# Patient Record
Sex: FEMALE | Race: OTHER | NOT HISPANIC OR LATINO
[De-identification: names, ages, dates, MRNs, and addresses within clinical notes are randomized per-mention and may not be internally consistent; named-entity substitution may affect disease eponyms.]

---

## 2018-03-25 ENCOUNTER — TRANSCRIPTION ENCOUNTER (OUTPATIENT)
Age: 48
End: 2018-03-25

## 2021-08-23 PROBLEM — Z00.00 ENCOUNTER FOR PREVENTIVE HEALTH EXAMINATION: Status: ACTIVE | Noted: 2021-08-23

## 2021-08-25 ENCOUNTER — OUTPATIENT (OUTPATIENT)
Dept: OUTPATIENT SERVICES | Facility: HOSPITAL | Age: 51
LOS: 1 days | Discharge: HOME | End: 2021-08-25

## 2021-08-25 DIAGNOSIS — D69.6 THROMBOCYTOPENIA, UNSPECIFIED: ICD-10-CM

## 2021-08-26 ENCOUNTER — RESULT REVIEW (OUTPATIENT)
Age: 51
End: 2021-08-26

## 2021-08-26 ENCOUNTER — APPOINTMENT (OUTPATIENT)
Dept: HEMATOLOGY ONCOLOGY | Facility: CLINIC | Age: 51
End: 2021-08-26
Payer: COMMERCIAL

## 2021-08-26 VITALS
DIASTOLIC BLOOD PRESSURE: 84 MMHG | HEIGHT: 64 IN | WEIGHT: 214.5 LBS | HEART RATE: 83 BPM | SYSTOLIC BLOOD PRESSURE: 130 MMHG | BODY MASS INDEX: 36.62 KG/M2 | OXYGEN SATURATION: 97 %

## 2021-08-26 DIAGNOSIS — D47.3 ESSENTIAL (HEMORRHAGIC) THROMBOCYTHEMIA: ICD-10-CM

## 2021-08-26 DIAGNOSIS — Z86.59 PERSONAL HISTORY OF OTHER MENTAL AND BEHAVIORAL DISORDERS: ICD-10-CM

## 2021-08-26 DIAGNOSIS — Z86.39 PERSONAL HISTORY OF OTHER ENDOCRINE, NUTRITIONAL AND METABOLIC DISEASE: ICD-10-CM

## 2021-08-26 PROCEDURE — 99205 OFFICE O/P NEW HI 60 MIN: CPT

## 2021-08-26 PROCEDURE — 85060 BLOOD SMEAR INTERPRETATION: CPT

## 2021-08-26 RX ORDER — CHLORHEXIDINE GLUCONATE 4 %
325 (65 FE) LIQUID (ML) TOPICAL DAILY
Qty: 90 | Refills: 0 | Status: ACTIVE | COMMUNITY
Start: 2021-08-26 | End: 1900-01-01

## 2021-08-27 LAB
ALBUMIN SERPL ELPH-MCNC: 4 G/DL
ALP BLD-CCNC: 64 U/L
ALT SERPL-CCNC: 12 U/L
ANION GAP SERPL CALC-SCNC: 11 MMOL/L
AST SERPL-CCNC: 20 U/L
BILIRUB SERPL-MCNC: 0.2 MG/DL
BUN SERPL-MCNC: 9 MG/DL
CALCIUM SERPL-MCNC: 8.8 MG/DL
CHLORIDE SERPL-SCNC: 108 MMOL/L
CO2 SERPL-SCNC: 23 MMOL/L
CREAT SERPL-MCNC: 0.68 MG/DL
FERRITIN SERPL-MCNC: 9 NG/ML
GLUCOSE SERPL-MCNC: 101 MG/DL
IRON SATN MFR SERPL: 2 %
IRON SERPL-MCNC: 9 UG/DL
POTASSIUM SERPL-SCNC: 4.4 MMOL/L
PROT SERPL-MCNC: 6.9 G/DL
RBC # BLD: 4.59 M/UL
RETICS # AUTO: 1.2 %
RETICS AGGREG/RBC NFR: 54.2 K/UL
SODIUM SERPL-SCNC: 142 MMOL/L
TIBC SERPL-MCNC: 404 UG/DL
UIBC SERPL-MCNC: 395 UG/DL

## 2021-08-31 ENCOUNTER — APPOINTMENT (OUTPATIENT)
Dept: GASTROENTEROLOGY | Facility: CLINIC | Age: 51
End: 2021-08-31

## 2021-09-08 ENCOUNTER — APPOINTMENT (OUTPATIENT)
Dept: GASTROENTEROLOGY | Facility: CLINIC | Age: 51
End: 2021-09-08
Payer: COMMERCIAL

## 2021-09-08 VITALS
RESPIRATION RATE: 14 BRPM | BODY MASS INDEX: 36.7 KG/M2 | DIASTOLIC BLOOD PRESSURE: 78 MMHG | SYSTOLIC BLOOD PRESSURE: 122 MMHG | HEART RATE: 86 BPM | TEMPERATURE: 98 F | OXYGEN SATURATION: 98 % | WEIGHT: 215 LBS | HEIGHT: 64 IN

## 2021-09-08 DIAGNOSIS — Z83.79 FAMILY HISTORY OF OTHER DISEASES OF THE DIGESTIVE SYSTEM: ICD-10-CM

## 2021-09-08 DIAGNOSIS — Z83.49 FAMILY HISTORY OF OTHER ENDOCRINE, NUTRITIONAL AND METABOLIC DISEASES: ICD-10-CM

## 2021-09-08 DIAGNOSIS — Z78.9 OTHER SPECIFIED HEALTH STATUS: ICD-10-CM

## 2021-09-08 DIAGNOSIS — D50.9 IRON DEFICIENCY ANEMIA, UNSPECIFIED: ICD-10-CM

## 2021-09-08 DIAGNOSIS — K50.90 CROHN'S DISEASE, UNSPECIFIED, W/OUT COMPLICATIONS: ICD-10-CM

## 2021-09-08 DIAGNOSIS — Z83.2 FAMILY HISTORY OF DISEASES OF THE BLOOD AND BLOOD-FORMING ORGANS AND CERTAIN DISORDERS INVOLVING THE IMMUNE MECHANISM: ICD-10-CM

## 2021-09-08 DIAGNOSIS — Z12.11 ENCOUNTER FOR SCREENING FOR MALIGNANT NEOPLASM OF COLON: ICD-10-CM

## 2021-09-08 PROCEDURE — 99204 OFFICE O/P NEW MOD 45 MIN: CPT

## 2021-09-08 RX ORDER — LEVOTHYROXINE SODIUM 125 UG/1
125 TABLET ORAL
Refills: 0 | Status: ACTIVE | COMMUNITY

## 2021-09-08 RX ORDER — SODIUM SULFATE, POTASSIUM SULFATE, MAGNESIUM SULFATE 17.5; 3.13; 1.6 G/ML; G/ML; G/ML
17.5-3.13-1.6 SOLUTION, CONCENTRATE ORAL
Qty: 1 | Refills: 0 | Status: DISCONTINUED | COMMUNITY
Start: 2021-09-08 | End: 2021-09-08

## 2021-09-08 RX ORDER — LOSARTAN POTASSIUM 50 MG/1
50 TABLET, FILM COATED ORAL
Refills: 0 | Status: ACTIVE | COMMUNITY

## 2021-09-08 NOTE — ASSESSMENT
[FreeTextEntry1] : Patient is a 50 y/o female with a h/o Crohns disease s/p right hemicolectomy in 2009 who has multiple abdominal surgeries who presents with worsening iron deficiency anemia. \par \par Most recent colonoscopy was in 2018, EGD was done last year prior to her hiatal hernia surgery/repair. \par \par Patient is s/p gastric Laband, removal then subsequent gastric sleeve. EZEKIEL possibly 2/2 malabsorption but will need EGD/Colonoscopy to r/o occult GI bleed. \par \par EGD to be scheduled to r/o gastritis, esophagitis, Samuel's Esophagus, or PUD. Indication, risks and benefit of EGD discussed with patient in detail. All questions answered. Patient is medically optimized for EGD.\par \par Colonoscopy to be scheduled. I have discussed the indications, risks and benefits of procedure with patient. Risks include, but not limited to, bleeding, perforation, infection, and reaction to anesthesia. Alternatives to colonoscopy discussed with patient. Patient was given the opportunity to ask questions, all questions were answered. The patient agrees to proceed with colonoscopy. Patient is medically optimized for colonoscopy. \par \par Miralax/Dulcolax prep to be used. Bowel prep instructions discussed at length. \par \par Hold Iron supplement x 1 week prior to procedure. I preferred Suprep but pt states she is unable to do Suprep or golytely after multiple bad experiences with procedures/bowel prep as a child.

## 2021-09-08 NOTE — PHYSICAL EXAM
[General Appearance - Alert] : alert [General Appearance - In No Acute Distress] : in no acute distress [Sclera] : the sclera and conjunctiva were normal [PERRL With Normal Accommodation] : pupils were equal in size, round, and reactive to light [Extraocular Movements] : extraocular movements were intact [Outer Ear] : the ears and nose were normal in appearance [Oropharynx] : the oropharynx was normal [Auscultation Breath Sounds / Voice Sounds] : lungs were clear to auscultation bilaterally [Heart Rate And Rhythm] : heart rate was normal and rhythm regular [Heart Sounds Gallop] : no gallops [Heart Sounds] : normal S1 and S2 [Murmurs] : no murmurs [Heart Sounds Pericardial Friction Rub] : no pericardial rub [Bowel Sounds] : normal bowel sounds [Abdomen Soft] : soft [] : no hepato-splenomegaly [Abdomen Tenderness] : non-tender [Patient Refused] : rectal exam was refused by the patient [No Focal Deficits] : no focal deficits [Oriented To Time, Place, And Person] : oriented to person, place, and time [Impaired Insight] : insight and judgment were intact [Affect] : the affect was normal [FreeTextEntry1] : +surgical scars

## 2021-09-08 NOTE — HISTORY OF PRESENT ILLNESS
[de-identified] : Patient is a 51 year female, with PMH of Crohns disease diagnosed at 17 y/o s/p right hemicolectomy, s/p splenectomy, s/p Laband in 2012, removal in 2017 and gastric sleeve in 2018, s/p hiatal hernia repair in 2020, who presents for evaluation of anemia. \par \par Patient was diagnosed with CD at 17 y/o and was treated with 6MP, Remicade, Humira and various other modalities prior to having right hemicolectomy in 2009. Since then, she has been in remission.\par \par She has a h/o EZEKIEL but states it significant worsened over the last year. She was referred by hematology for colonoscopy. She is seeing Dr. Townsend, labs done 8/26/21 showed Hgb 8.5 with low iron studies. In the past, patient took oral iron supplement but was unable to tolerate constipation so she self-discontinued. However, she is currently taking oral iron and Miralax daily and tolerating it well. She plans to f/u with hematology in October 2021 for repeat labs and to ensure improvement in Hgb and, if not, will be scheduled for iron infusion. \par \par Patient had splenectomy in 2001. Patient had thyroidectomy in August 2021. Cholecystectomy a few years ago. \par \par Laband in 2012, removal in 2017, gastric sleeve in 2018. Was doing well but in September 2020 she had hiatal hernia repaired and states that since then, she has been able to eat whatever she wants, large amounts - gained 30lbs since the surgery. \par \par Last colonoscopy was in 2018, unsure of results. She had an EGD at [facility] in 2020, prior to hiatal hernia repair. \par \par Patient denies any melena or BRBPR. No obvious bleeding per rectum but was recommended to have colonoscopy by hematology.

## 2021-09-29 ENCOUNTER — OUTPATIENT (OUTPATIENT)
Dept: OUTPATIENT SERVICES | Facility: HOSPITAL | Age: 51
LOS: 1 days | Discharge: HOME | End: 2021-09-29

## 2021-09-29 DIAGNOSIS — D69.6 THROMBOCYTOPENIA, UNSPECIFIED: ICD-10-CM

## 2021-10-04 ENCOUNTER — APPOINTMENT (OUTPATIENT)
Dept: HEMATOLOGY ONCOLOGY | Facility: CLINIC | Age: 51
End: 2021-10-04

## 2021-10-18 ENCOUNTER — APPOINTMENT (OUTPATIENT)
Dept: HEMATOLOGY ONCOLOGY | Facility: CLINIC | Age: 51
End: 2021-10-18

## 2021-10-24 DIAGNOSIS — Z01.818 ENCOUNTER FOR OTHER PREPROCEDURAL EXAMINATION: ICD-10-CM

## 2021-10-26 ENCOUNTER — APPOINTMENT (OUTPATIENT)
Dept: DISASTER EMERGENCY | Facility: CLINIC | Age: 51
End: 2021-10-26

## 2021-10-29 ENCOUNTER — APPOINTMENT (OUTPATIENT)
Dept: GASTROENTEROLOGY | Facility: GI CENTER | Age: 51
End: 2021-10-29

## 2024-07-02 ENCOUNTER — TRANSCRIPTION ENCOUNTER (OUTPATIENT)
Age: 54
End: 2024-07-02

## 2024-07-02 ENCOUNTER — APPOINTMENT (OUTPATIENT)
Dept: OBGYN | Facility: CLINIC | Age: 54
End: 2024-07-02
Payer: COMMERCIAL

## 2024-07-02 VITALS
DIASTOLIC BLOOD PRESSURE: 66 MMHG | WEIGHT: 234 LBS | SYSTOLIC BLOOD PRESSURE: 110 MMHG | BODY MASS INDEX: 39.95 KG/M2 | HEIGHT: 64 IN

## 2024-07-02 DIAGNOSIS — K50.919 CROHN'S DISEASE, UNSPECIFIED, WITH UNSPECIFIED COMPLICATIONS: ICD-10-CM

## 2024-07-02 DIAGNOSIS — N84.1 POLYP OF CERVIX UTERI: ICD-10-CM

## 2024-07-02 DIAGNOSIS — Z01.419 ENCOUNTER FOR GYNECOLOGICAL EXAMINATION (GENERAL) (ROUTINE) W/OUT ABNORMAL FINDINGS: ICD-10-CM

## 2024-07-02 DIAGNOSIS — Z86.018 PERSONAL HISTORY OF OTHER BENIGN NEOPLASM: ICD-10-CM

## 2024-07-02 DIAGNOSIS — E66.09 OTHER OBESITY DUE TO EXCESS CALORIES: ICD-10-CM

## 2024-07-02 DIAGNOSIS — N95.1 MENOPAUSAL AND FEMALE CLIMACTERIC STATES: ICD-10-CM

## 2024-07-02 LAB
CARD LOT #: NORMAL
CARD LOT EXP DATE: NORMAL
DATE COLLECTED: NORMAL
DEVELOPER LOT #: NORMAL
DEVELOPER LOT EXP DATE: NORMAL
HEMOCCULT 2: NEGATIVE
HEMOCCULT SP1 STL QL: NEGATIVE
QUALITY CONTROL: YES

## 2024-07-02 PROCEDURE — 99386 PREV VISIT NEW AGE 40-64: CPT

## 2024-07-02 PROCEDURE — 82270 OCCULT BLOOD FECES: CPT

## 2024-07-02 RX ORDER — LOSARTAN POTASSIUM 100 MG/1
TABLET, FILM COATED ORAL
Refills: 0 | Status: ACTIVE | COMMUNITY

## 2024-07-02 RX ORDER — LEVOTHYROXINE SODIUM 0.17 MG/1
TABLET ORAL
Refills: 0 | Status: ACTIVE | COMMUNITY

## 2024-07-02 RX ORDER — NEBIVOLOL 20 MG/1
TABLET ORAL
Refills: 0 | Status: ACTIVE | COMMUNITY

## 2024-07-02 RX ORDER — ESCITALOPRAM OXALATE 5 MG/1
TABLET, FILM COATED ORAL
Refills: 0 | Status: ACTIVE | COMMUNITY

## 2024-07-08 LAB — HPV HIGH+LOW RISK DNA PNL CVX: NOT DETECTED

## 2024-07-11 LAB — CYTOLOGY CVX/VAG DOC THIN PREP: ABNORMAL

## 2024-07-25 ENCOUNTER — APPOINTMENT (OUTPATIENT)
Dept: MAMMOGRAPHY | Facility: CLINIC | Age: 54
End: 2024-07-25
Payer: COMMERCIAL

## 2024-07-25 ENCOUNTER — APPOINTMENT (OUTPATIENT)
Dept: RADIOLOGY | Facility: CLINIC | Age: 54
End: 2024-07-25
Payer: COMMERCIAL

## 2024-07-25 ENCOUNTER — RESULT REVIEW (OUTPATIENT)
Age: 54
End: 2024-07-25

## 2024-07-25 ENCOUNTER — APPOINTMENT (OUTPATIENT)
Dept: ULTRASOUND IMAGING | Facility: CLINIC | Age: 54
End: 2024-07-25
Payer: COMMERCIAL

## 2024-07-25 PROCEDURE — 76830 TRANSVAGINAL US NON-OB: CPT | Mod: 26

## 2024-07-25 PROCEDURE — 77085 DXA BONE DENSITY AXL VRT FX: CPT | Mod: 26

## 2024-07-25 PROCEDURE — 77063 BREAST TOMOSYNTHESIS BI: CPT | Mod: 26

## 2024-07-25 PROCEDURE — 76856 US EXAM PELVIC COMPLETE: CPT | Mod: 26

## 2024-07-25 PROCEDURE — 77067 SCR MAMMO BI INCL CAD: CPT | Mod: 26

## 2024-07-26 DIAGNOSIS — R92.8 OTHER ABNORMAL AND INCONCLUSIVE FINDINGS ON DIAGNOSTIC IMAGING OF BREAST: ICD-10-CM

## 2024-08-06 ENCOUNTER — APPOINTMENT (OUTPATIENT)
Dept: OBGYN | Facility: CLINIC | Age: 54
End: 2024-08-06

## 2024-08-06 PROCEDURE — 99213 OFFICE O/P EST LOW 20 MIN: CPT

## 2024-08-06 PROCEDURE — G2211 COMPLEX E/M VISIT ADD ON: CPT

## 2024-08-06 NOTE — DISCUSSION/SUMMARY
[FreeTextEntry1] : Impression: Fibroid uterus, cervical polyp, irregular menses  Recommendations and discussion as noted above dictated consultation

## 2024-08-06 NOTE — HISTORY OF PRESENT ILLNESS
[FreeTextEntry1] : Patient is a 54-year-old female who presents for a follow-up consultation to discuss test results and treatment options.  Patient recently seen and was noted to have complaints of irregular menses.  Patient with a history of a fibroid uterus and extensive surgical history.  Patient was advised to undergo workup and evaluation.  On physical examination patient was noted to have a large 3 cm cervical polyp.  Pelvic sonogram reveals a 13 cm multiple fibroid uterus.  Patient did not have hormonal lab work as advised.  Discussed the pelvic sonogram results and fibroids with patient advised patient to have hormonal lab work done and to follow-up to discuss results and then to also discuss scheduling hysteroscopy D&C and cervical polypectomy.  Instructions and precautions reviewed further treatment pending test results  20 minutes of face time

## 2024-08-08 ENCOUNTER — RESULT REVIEW (OUTPATIENT)
Age: 54
End: 2024-08-08

## 2024-08-08 ENCOUNTER — APPOINTMENT (OUTPATIENT)
Dept: ULTRASOUND IMAGING | Facility: CLINIC | Age: 54
End: 2024-08-08

## 2024-08-08 ENCOUNTER — APPOINTMENT (OUTPATIENT)
Dept: MAMMOGRAPHY | Facility: CLINIC | Age: 54
End: 2024-08-08

## 2024-08-08 PROCEDURE — 77065 DX MAMMO INCL CAD UNI: CPT | Mod: 26,RT

## 2024-08-08 PROCEDURE — 77061 BREAST TOMOSYNTHESIS UNI: CPT | Mod: 26

## 2024-08-08 PROCEDURE — 76642 ULTRASOUND BREAST LIMITED: CPT | Mod: 26,50

## 2024-09-17 ENCOUNTER — APPOINTMENT (OUTPATIENT)
Dept: OBGYN | Facility: CLINIC | Age: 54
End: 2024-09-17
Payer: COMMERCIAL

## 2024-09-17 VITALS
WEIGHT: 234 LBS | SYSTOLIC BLOOD PRESSURE: 110 MMHG | BODY MASS INDEX: 39.95 KG/M2 | DIASTOLIC BLOOD PRESSURE: 64 MMHG | HEIGHT: 64 IN

## 2024-09-17 DIAGNOSIS — N84.1 POLYP OF CERVIX UTERI: ICD-10-CM

## 2024-09-17 DIAGNOSIS — N60.02 SOLITARY CYST OF LEFT BREAST: ICD-10-CM

## 2024-09-17 DIAGNOSIS — N95.1 MENOPAUSAL AND FEMALE CLIMACTERIC STATES: ICD-10-CM

## 2024-09-17 DIAGNOSIS — Z87.42 PERSONAL HISTORY OF OTHER DISEASES OF THE FEMALE GENITAL TRACT: ICD-10-CM

## 2024-09-17 DIAGNOSIS — Z86.018 PERSONAL HISTORY OF OTHER BENIGN NEOPLASM: ICD-10-CM

## 2024-09-17 DIAGNOSIS — N92.6 IRREGULAR MENSTRUATION, UNSPECIFIED: ICD-10-CM

## 2024-09-17 DIAGNOSIS — D25.9 LEIOMYOMA OF UTERUS, UNSPECIFIED: ICD-10-CM

## 2024-09-17 PROCEDURE — G2211 COMPLEX E/M VISIT ADD ON: CPT

## 2024-09-17 PROCEDURE — 99214 OFFICE O/P EST MOD 30 MIN: CPT

## 2024-09-17 RX ORDER — MISOPROSTOL 200 UG/1
200 TABLET ORAL
Qty: 2 | Refills: 0 | Status: ACTIVE | COMMUNITY
Start: 2024-09-17 | End: 1900-01-01

## 2024-09-17 NOTE — HISTORY OF PRESENT ILLNESS
[FreeTextEntry1] : Patient is a 54-year-old female presents for consultation to discuss her test results and treatment options.  Patient was seen in the office for an annual exam complained of irregular menses and bleeding on examination patient was noted to have a large 3 cm cervical polyp patient underwent a workup and evaluation hormonal lab work reveals perimenopausal hormone changes.  Pelvic sonogram reveals an enlarged known fibroid uterus measuring approximately 13 cm with multiple fibroids.  Mammogram reveals a left breast cyst.  Discussed these findings in detail with the patient advised patient have a hysteroscopy D&C and polypectomy procedures and ASU procedure risk benefits and alternatives reviewed and questions answered patient is amenable and will schedule surgery.  Pre and postoperative instructions given.  Patient also advised to have surgical breast consultation in view of the findings of the left breast cyst.  Referral given.  Consent precautions reviewed  30 minutes of face time

## 2024-09-17 NOTE — DISCUSSION/SUMMARY
[FreeTextEntry1] : Impression: Irregular menses, cervical polyp, fibroid uterus, left breast cyst  Recommendations and discussion as noted above dictated consultation note